# Patient Record
Sex: FEMALE | Race: BLACK OR AFRICAN AMERICAN | Employment: FULL TIME | URBAN - METROPOLITAN AREA
[De-identification: names, ages, dates, MRNs, and addresses within clinical notes are randomized per-mention and may not be internally consistent; named-entity substitution may affect disease eponyms.]

---

## 2019-05-08 ENCOUNTER — HOSPITAL ENCOUNTER (OUTPATIENT)
Dept: PHYSICAL THERAPY | Age: 34
Discharge: HOME | End: 2019-05-08
Payer: COMMERCIAL

## 2019-05-08 PROCEDURE — 97162 PT EVAL MOD COMPLEX 30 MIN: CPT

## 2019-05-08 PROCEDURE — 97140 MANUAL THERAPY 1/> REGIONS: CPT

## 2019-05-08 PROCEDURE — 97110 THERAPEUTIC EXERCISES: CPT

## 2019-05-08 NOTE — PROGRESS NOTES
[facility] [address] [facility] [place], [address] [place]  
[phone] fax Plan of Care/ Statement of Necessity for Physical Therapy Services Patient name: Divya Irene Start of Care: 2019 Referral source: Mary Jane Patel MD : 1985 Medical Diagnosis: Left shoulder pain [M25.512] Payor: [facility] / Plan: [facility] PPO / Product Type: PPO /  Onset Date:~3 months prior to evaluation Treatment Diagnosis: Left Shoulder Pain Prior Hospitalization: see medical history Provider#: [id number] Medications: Verified on Patient summary List  
 Comorbidities: Depression, Allergies, BMI>30, HTN Prior Level of Function: RHD, (I) Functional ADLs, Does Not Work Out, Work Full Time, (I) Driving The Plan of Care and following information is based on the information from the initial evaluation. Assessment: 
 
Patient presents with s/s consistent with chronic left rotator cuff tendinopathy with patient noted to demonstrate painful, but full strength of the shoulder external rotators. Patient noted with severe loss of shoulder AROM with greater available shoulder PROM in comparison but empty end-feels noted at end-range. High symptom irritability and severity demonstrated. Unable to clear cervical spine at this time with patient with (+) cervical compression but noted with vague symptom presentation therefore increasing difficulty with diagnosis. To continue to monitor response to PT with further assessment of cervical spine as appropriate.  To emphasize improvements in shoulder functional mobility and strength to improve ease with return to PLOF. 
  
Patient will continue to benefit from skilled PT services to modify and progress therapeutic interventions, address functional mobility deficits, address ROM deficits, address strength deficits, analyze and address soft tissue restrictions, analyze and cue movement patterns and analyze and modify body mechanics/ergonomics to attain remaining goals. Key Information: 
 
Cervical Screen: No symptom change with cervical AROM except: Right Cervical Rotation (WNL; Right superior shoulder pain) 
            - (+) Compression, (-) Left Spurling, (-) Distraction 
  
ROM:  [] Unable to assess at this time AROM                                                               PROM 
  Left Right   Left Right Flexion 67 165 Flexion 90 (p!, empty)   Extension   60 Extension      
Abduction 30 160 Abduction 120 (p!, empty)    
ER @ 0 Degrees     ER @ 0 Degrees      
Functional ER NT T3 ER @ 90 Degrees 78    
Functional IR NT T6 IR @ 90 Degrees 40    
  
**Patient with high irritability/severity of symptoms altering objective assessment able to be completed Evaluation Complexity History MEDIUM  Complexity : 1-2 comorbidities / personal factors will impact the outcome/ POC ; Examination MEDIUM Complexity : 3 Standardized tests and measures addressing body structure, function, activity limitation and / or participation in recreation  ;Presentation MEDIUM Complexity : Evolving with changing characteristics  ; Clinical Decision Making MEDIUM Complexity : FOTO score of 26-74 Overall Complexity Rating: MEDIUM Problem List: pain affecting function, decrease ROM, decrease strength, decrease ADL/ functional abilitiies, decrease activity tolerance and decrease flexibility/ joint mobility Treatment Plan may include any combination of the following: Therapeutic exercise, Therapeutic activities, Neuromuscular re-education, Physical agent/modality, Manual therapy, Patient education, Self Care training, Functional mobility training and Home safety training Patient / Family readiness to learn indicated by: asking questions, trying to perform skills and interest 
 Persons(s) to be included in education: patient (P) Barriers to Learning/Limitations: None Patient Goal (s): I want to get rid of this pain Patient Self Reported Health Status: good Rehabilitation Potential: good Short Term Goals: To be accomplished in 3 weeks: 1. Patient will subjectively report full compliance with prescribed HEP. Eval: HEP provided 2. Patient will demonstrate left shoulder flexion and abduction PROM >/= 160 degrees to improve ease with dressing. Eval: Left Shoulder Flexion PROM 120 deg, Left Shoulder Abduction PROM 90 deg 3. Patient will demonstrate left shoulder flexion and abduction AROM >/= 150 degrees to improve ease with overhead reaching. Eval: Left Shoulder Flexion AROM = 67 deg, Left Shoulder Abduction AROM = 30 deg Long Term Goals: To be accomplished in 6 weeks: 1. Patient will demonstrate a significant functional improvement as demonstrated by a score of >/= 69 on FOTO. Eval: FOTO = 41 
2. Patient will demonstrate left shoulder flexion and abduction MMT >/= 4+/5 to improve ease with work-related ADLs. Eval: Left Shoulder Flexion MMT = 2+/5, Left Shoulder Abduction MMT = 2_/5 3. Patient will subjectively report >/= 70% improvement in symptoms to improve overall quality of life. Eval: 0% Frequency / Duration: Patient to be seen 2 times per week for 6 weeks. Patient/ Caregiver education and instruction: Diagnosis, prognosis, self care, activity modification and exercises 
 [x]  Plan of care has been reviewed with RAJ Bolton, PT 5/8/2019 12:26 PM 
________________________________________________________________________ I certify that the above Therapy Services are being furnished while the patient is under my care. I agree with the treatment plan and certify that this therapy is necessary.  
 
[de-identified] Signature:____________Date:_________TIME:________ 
 
[employer], Date and Time must be completed for valid certification ** 
 Please sign and return to [facility] [address] [facility] [place], [address] [place]  
[phone] fax

## 2019-05-08 NOTE — PROGRESS NOTES
PT DAILY TREATMENT NOTE 10-18 Patient Name: Quincy Peck 
Date:2019 : 1985 [x]  Patient  Verified Payor: BLUE CROSS / Plan: [facility] PPO / Product Type: PPO / In time:200  Out time:258 Total Treatment Time (min): 58 Visit #: 1 of 12 Medicare/BCBS Only Total Timed Codes (min):  48 1:1 Treatment Time:  21 Treatment Area: Left shoulder pain [M25.512] Physical Therapy Evaluation - Shoulder SUBJECTIVE Any medication changes, allergies to medications, adverse drug reactions, diagnosis change, or new procedure performed?: [x] No    [] Yes (see summary sheet for update) Subjective functional status/changes:    
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 Patient denies previous episodes of shoulder pain. Patient reports gradual worsening of pain since onset with increase in pain with overhead ROM and left sidelying. Patient reports improved symptoms with support of the left UE. Patient denies sleep disturbances with avoidance of left sidelying. Patient denies the following: N/T, chest pain, tightness, shortness of breath. Pain Intensity (0-10, VAS): Current 5, Worst 8, Best 5 Patient Goals: \"I want to get rid of this pain\" OBJECTIVE EXAMINATION 
 
BP: 140/86 mmHg Posture: Flattened thoracic kyphosis Cervical Screen: No symptom change with cervical AROM except: Right Cervical Rotation (WNL; Right superior shoulder pain) 
 - (+) Compression, (-) Left Spurling, (-) Distraction ROM:  [] Unable to assess at this time AROM                                                               PROM Left Right  Left Right Flexion 67 165 Flexion 90 (p!, empty) Extension  60 Extension Abduction 30 160 Abduction 120 (p!, empty) ER @ 0 Degrees   ER @ 0 Degrees Functional ER NT T3 ER @ 90 Degrees 78 Functional IR NT T6 IR @ 90 Degrees 40 Strength:   [] Unable to assess at this time L (1-5) R (1-5) Flexors 2+ 5 Abductors 2+ 5 External Rotators 4+ (p!) 5 Internal Rotators 5 5 Extensors 5 5 Elbow Flexion 5 5 Elbow Extension 5 5 Joint Mobility Palpation: Non-specific TTP to left ACJ, left subacromial space, left upper arm ER Lag  [x] Pos   [] Neg  
 
**Patient with high irritability/severity of symptoms altering objective assessment able to be completed OBJECTIVE Modality rationale: decrease inflammation and decrease pain to improve the patients ability to improve ease with sleep Min Type Additional Details 10 [x]  Ice     []  heat 
[]  Ice massage Position: Seated Location: Left Shoulder, Post-Tx 25 min [x]Eval                  []Re-Eval  
 
8 min Therapeutic Exercise:  [x] See flow sheet : Patient educated regarding completion of prescribed HEP and provided with written HEP instructions, Patient educated regarding diagnosis and PT PoC Rationale: increase ROM and increase strength to improve the patients ability to improve ease with functional ADLs 5 min Therapeutic Activity:  [x]  See flow sheet : Discussion regarding modification of work related and household ADLs Rationale: increase ROM and increase strength  to improve the patients ability to improve ease with work-related ADLs 10 min Manual Therapy:   
Supine, Left GH AP Grade I Mobilization (OPP) Supine, Left GH Long Axis Inferior Mobilization ([employer] deg abd) Supine, Left GH Lateral Grade I-II Distraction (OPP) Supine, Left GH Passive Physiological Mobilization with [address] Distraction - Flexion/Scaption/Abduction Rationale: decrease pain, increase ROM and increase tissue extensibility to improve ease with overhead reaching. With 
 [] TE 
 [] TA 
 [] neuro 
 [] other: Patient Education: [x] Review HEP [] Progressed/Changed HEP based on:  
[] positioning   [] body mechanics   [] transfers   [] heat/ice application   
[] other:   
 
Other Objective/Functional Measures: See objective above. Pain Level (0-10 scale) post treatment: 6 
 
ASSESSMENT/Changes in Function: Patient presents with s/s consistent with chronic left rotator cuff tendinopathy with patient noted to demonstrate painful, but full strength of the shoulder external rotators. Patient noted with severe loss of shoulder AROM with greater available shoulder PROM in comparison but empty end-feels noted at end-range. High symptom irritability and severity demonstrated. Unable to clear cervical spine at this time with patient with (+) cervical compression but noted with vague symptom presentation therefore increasing difficulty with diagnosis. To continue to monitor response to PT with further assessment of cervical spine as appropriate. To emphasize improvements in shoulder functional mobility and strength to improve ease with return to PLOF. Patient will continue to benefit from skilled PT services to modify and progress therapeutic interventions, address functional mobility deficits, address ROM deficits, address strength deficits, analyze and address soft tissue restrictions, analyze and cue movement patterns and analyze and modify body mechanics/ergonomics to attain remaining goals. [x]  See Plan of Care 
[]  See progress note/recertification []  See Discharge Summary Progress towards goals / Updated goals: 
 
Short Term Goals: To be accomplished in 3 weeks: 1. Patient will subjectively report full compliance with prescribed HEP. Eval: HEP provided 2. Patient will demonstrate left shoulder flexion and abduction PROM >/= 160 degrees to improve ease with dressing. Eval: Left Shoulder Flexion PROM 120 deg, Left Shoulder Abduction PROM 90 deg 3. Patient will demonstrate left shoulder flexion and abduction AROM >/= 150 degrees to improve ease with overhead reaching. Eval: Left Shoulder Flexion AROM = 67 deg, Left Shoulder Abduction AROM = 30 deg Long Term Goals: To be accomplished in 6 weeks: 1. Patient will demonstrate a significant functional improvement as demonstrated by a score of >/= 69 on FOTO. Eval: FOTO = 41 
2. Patient will demonstrate left shoulder flexion and abduction MMT >/= 4+/5 to improve ease with work-related ADLs. Eval: Left Shoulder Flexion MMT = 2+/5, Left Shoulder Abduction MMT = 2_/5 3. Patient will subjectively report >/= 70% improvement in symptoms to improve overall quality of life. Eval: 0% PLAN [x]  Upgrade activities as tolerated     []  Continue plan of care 
[]  Update interventions per flow sheet      
[]  Discharge due to:_ 
[]  Other:_   
 
Franko Bennett PT 5/8/2019  12:26 PM 
 
Future Appointments Date Time Provider Maggi Meza 5/8/2019  2:00 PM Leigh Walsh, PT [facility]

## 2019-05-10 ENCOUNTER — HOSPITAL ENCOUNTER (OUTPATIENT)
Dept: PHYSICAL THERAPY | Age: 34
Discharge: HOME | End: 2019-05-10
Payer: COMMERCIAL

## 2019-05-10 PROCEDURE — 97110 THERAPEUTIC EXERCISES: CPT | Performed by: PHYSICAL THERAPIST

## 2019-05-10 PROCEDURE — 97140 MANUAL THERAPY 1/> REGIONS: CPT | Performed by: PHYSICAL THERAPIST

## 2019-05-10 NOTE — PROGRESS NOTES
PT DAILY TREATMENT NOTE 10-18 Patient Name: Tyrone Luke 
Date:5/10/2019 : 1985 [x]  Patient  Verified Payor: BLUE CROSS / Plan: [facility] PPO / Product Type: PPO / In time:3:02  Out time:3:43 Total Treatment Time (min): 41 Visit #: 2 of 12 Medicare/BCBS Only Total Timed Codes (min):  31 1:1 Treatment Time:  30 Treatment Area: Left shoulder pain [M25.512] SUBJECTIVE Pain Level (0-10 scale): 5/10 Any medication changes, allergies to medications, adverse drug reactions, diagnosis change, or new procedure performed?: [x] No    [] Yes (see summary sheet for update) Subjective functional status/changes:   [] No changes reported Patient reports compliance with her HEP. Has difficulty with the isometric exercise, notes it is slightly painful. OBJECTIVE Modality rationale: decrease pain to improve the patients ability to increase their functional activity level. Min Type Additional Details  
 [] Estim:  []Unatt       []IFC  []Premod []Other:  []w/ice   []w/heat Position: Location:  
 [] Estim: []Att    []TENS instruct  []NMES []Other:  []w/US   []w/ice   []w/heat Position: Location:  
 []  Traction: [] Cervical       []Lumbar 
                     [] Prone          []Supine []Intermittent   []Continuous Lbs: 
[] before manual 
[] after manual  
 []  Ultrasound: []Continuous   [] Pulsed []1MHz   []3MHz W/cm2: 
Location:  
 []  Iontophoresis with dexamethasone Location: [] Take home patch  
[] In clinic  
10 [x]  Ice     []  heat 
[]  Ice massage 
[]  Laser  
[]  Anodyne Position: Supine Location: left shoulder  
 []  Laser with stim 
[]  Other:  Position: Location:  
 []  Vasopneumatic Device Pressure:       [] lo [] med [] hi  
Temperature: [] lo [] med [] hi  
[] Skin assessment post-treatment:  []intact []redness- no adverse reaction []redness  adverse reaction:  
 
 
23 min Therapeutic Exercise:  [] See flow sheet :  
Rationale: increase ROM and increase strength to improve the patients ability to increase their functional activity level. 8 min Manual Therapy:  Grade I mobs for decreased pain, gentle manual stretching to increase elevation and rotation. Rationale: decrease pain, increase ROM and increase tissue extensibility to increase ease of motion to improve function. With 
 [] TE 
 [] TA 
 [] neuro 
 [] other: Patient Education: [x] Review HEP [] Progressed/Changed HEP based on:  
[] positioning   [] body mechanics   [] transfers   [] heat/ice application   
[] other:   
 
Other Objective/Functional Measures: Reviewed isometric abduction exercise and advised patient to try submaximal effort then build up. Pain Level (0-10 scale) post treatment: 5/10 ASSESSMENT/Changes in Function: Patient continues with pain and limited AROM left shoulder. Patient will continue to benefit from skilled PT services to modify and progress therapeutic interventions, address functional mobility deficits, address ROM deficits, address strength deficits, analyze and address soft tissue restrictions, analyze and cue movement patterns and analyze and modify body mechanics/ergonomics to attain remaining goals. [x]  See Plan of Care 
[]  See progress note/recertification 
[]  See Discharge Summary Progress towards goals / Updated goals: 
Short Term Goals: To be accomplished in 3 weeks: 1. Patient will subjectively report full compliance with prescribed HEP. Eval: HEP provided 2. Patient will demonstrate left shoulder flexion and abduction PROM >/= 160 degrees to improve ease with dressing. Eval: Left Shoulder Flexion PROM 120 deg, Left Shoulder Abduction PROM 90 deg 3. Patient will demonstrate left shoulder flexion and abduction AROM >/= 150 degrees to improve ease with overhead reaching. Eval: Left Shoulder Flexion AROM = 67 deg, Left Shoulder Abduction AROM = 30 deg 
  
Long Term Goals: To be accomplished in 6 weeks: 1. Patient will demonstrate a significant functional improvement as demonstrated by a score of >/= 69 on FOTO. Eval: FOTO = 41 
2. Patient will demonstrate left shoulder flexion and abduction MMT >/= 4+/5 to improve ease with work-related ADLs. Eval: Left Shoulder Flexion MMT = 2+/5, Left Shoulder Abduction MMT = 2_/5 3. Patient will subjectively report >/= 70% improvement in symptoms to improve overall quality of life. Eval: 0% PLAN [x]  Upgrade activities as tolerated     [x]  Continue plan of care 
[]  Update interventions per flow sheet      
[]  Discharge due to:_ 
[]  Other:_ Preston Byrd PT 5/10/2019  3:30 PM 
 
Future Appointments Date Time Provider Maggi Meza 5/13/2019  4:00 PM Dinorah Emmanuel, PTA [facility]  
5/15/2019  4:00 PM Chad Carmona [facility]  
5/20/2019  4:00 PM Dinorah Emmanuel [place] [facility]  
5/22/2019  4:00 PM Adi James, PT [facility]  
5/29/2019 11:30 AM Dinorah Emmanuel, PTA [facility]  
5/31/2019 11:00 AM Arnav Aguilera [facility]  
6/3/2019  4:30 PM Copeland Lien, PTA [facility]  
6/5/2019  4:00 PM Adi James, PT [facility]  
6/10/2019  4:00 PM Chad Carmona [facility]  
6/12/2019  4:00 PM Adi James, PT [facility]

## 2019-05-13 ENCOUNTER — HOSPITAL ENCOUNTER (OUTPATIENT)
Dept: PHYSICAL THERAPY | Age: 34
Discharge: HOME | End: 2019-05-13
Payer: COMMERCIAL

## 2019-05-13 PROCEDURE — 97110 THERAPEUTIC EXERCISES: CPT

## 2019-05-13 PROCEDURE — 97140 MANUAL THERAPY 1/> REGIONS: CPT

## 2019-05-13 NOTE — PROGRESS NOTES
PT DAILY TREATMENT NOTE 10-18 Patient Name: Irving Bernardo 
Date:2019 : 1985 [x]  Patient  Verified Payor: BLUE CROSS / Plan: [facility] PPO / Product Type: PPO / In time:4:01  Out time:4:49 Total Treatment Time (min): 48 Visit #: 3 of 12 Medicare/BCBS Only Total Timed Codes (min):  38 1:1 Treatment Time:  45 Treatment Area: Left shoulder pain [M25.512] SUBJECTIVE Pain Level (0-10 scale): 5 Any medication changes, allergies to medications, adverse drug reactions, diagnosis change, or new procedure performed?: [x] No    [] Yes (see summary sheet for update) Subjective functional status/changes:   [] No changes reported Pt reports that her shoulder has increase in pain when she has to lift to carry her niece. OBJECTIVE Modality rationale: decrease pain to improve the patients ability to decrease difficulty while performing tasks. Min Type Additional Details  
 [] Estim:  []Unatt       []IFC  []Premod []Other:  []w/ice   []w/heat Position: Location:  
 [] Estim: []Att    []TENS instruct  []NMES []Other:  []w/US   []w/ice   []w/heat Position: Location:  
 []  Traction: [] Cervical       []Lumbar 
                     [] Prone          []Supine []Intermittent   []Continuous Lbs: 
[] before manual 
[] after manual  
 []  Ultrasound: []Continuous   [] Pulsed []1MHz   []3MHz W/cm2: 
Location:  
 []  Iontophoresis with dexamethasone Location: [] Take home patch  
[] In clinic  
10 [x]  Ice     []  heat 
[]  Ice massage 
[]  Laser  
[]  Anodyne Position:sitting Location:left shoulder  
 []  Laser with stim 
[]  Other:  Position: Location:  
 []  Vasopneumatic Device Pressure:       [] lo [] med [] hi  
Temperature: [] lo [] med [] hi  
[] Skin assessment post-treatment:  []intact []redness- no adverse reaction 
  []redness  adverse reaction: 28 min Therapeutic Exercise:  [x] See flow sheet :  
Rationale: increase ROM and increase strength to improve the patients ability to increase tolerance to activities. 10 min Manual Therapy:  STJ mobs, GHJ mobs, PROM, STM/TPR UT/LS subscap Rationale: decrease pain, increase ROM, increase tissue extensibility and decrease trigger points to increase ease with ADLs. With 
 [] TE 
 [] TA 
 [] neuro 
 [] other: Patient Education: [x] Review HEP [] Progressed/Changed HEP based on:  
[] positioning   [] body mechanics   [] transfers   [] heat/ice application   
[] other:   
 
Other Objective/Functional Measures:  Pt reports performing HEP 5x a week. Pain Level (0-10 scale) post treatment: 4 
 
ASSESSMENT/Changes in Function: Pt continue to have high irritability with exercises. Pt reports sub iso cause increase in pain. Patient will continue to benefit from skilled PT services to modify and progress therapeutic interventions, address functional mobility deficits, address ROM deficits, address strength deficits and analyze and address soft tissue restrictions to attain remaining goals. []  See Plan of Care 
[]  See progress note/recertification 
[]  See Discharge Summary Progress towards goals / Updated goals: 
Short Term Goals: To be accomplished in 3 weeks: 1. Patient will subjectively report full compliance with prescribed HEP. Eval: HEP provided Current: Progressing: Pt reports performing HEP 5x a week. 5/13/19 2. Patient will demonstrate left shoulder flexion and abduction PROM >/= 160 degrees to improve ease with dressing. Eval: Left Shoulder Flexion PROM 120 deg, Left Shoulder Abduction PROM 90 deg 3. Patient will demonstrate left shoulder flexion and abduction AROM >/= 150 degrees to improve ease with overhead reaching. Eval: Left Shoulder Flexion AROM = 67 deg, Left Shoulder Abduction AROM = 30 deg 
  
Long Term Goals: To be accomplished in 6 weeks: 1. Patient will demonstrate a significant functional improvement as demonstrated by a score of >/= 69 on FOTO. Eval: FOTO = 41 
2. Patient will demonstrate left shoulder flexion and abduction MMT >/= 4+/5 to improve ease with work-related ADLs. Eval: Left Shoulder Flexion MMT = 2+/5, Left Shoulder Abduction MMT = 2_/5 3. Patient will subjectively report >/= 70% improvement in symptoms to improve overall quality of life. Eval: 0% 
  
  
 
 
 
PLAN 
[]  Upgrade activities as tolerated     [x]  Continue plan of care 
[]  Update interventions per flow sheet      
[]  Discharge due to:_ 
[]  Other:_ Maryjane Mccormick PTA 5/13/2019  4:07 PM 
 
Future Appointments Date Time Provider Maggi Meza 5/15/2019  2:30 PM Albina Thurman PTA [facility]  
5/20/2019  4:00 PM Albina Thurman [place] [facility]  
5/22/2019  4:00 PM Ramona Hernandez PT [facility]  
5/29/2019 11:30 AM Albina Thurman PTA [facility]  
5/31/2019 11:00 AM Juan Sanders [facility]  
6/3/2019  4:30 PM Albina Thurman PTA [facility]  
6/5/2019  4:00 PM Matheus, [address]ppy[address] Jonathan Barrett [facility]  
6/10/2019  4:00 PM Albina Thurman [place] [facility]  
6/12/2019  4:00 PM Ramona Hernandez PT [facility]

## 2019-05-15 ENCOUNTER — HOSPITAL ENCOUNTER (OUTPATIENT)
Dept: PHYSICAL THERAPY | Age: 34
Discharge: HOME | End: 2019-05-15
Payer: COMMERCIAL

## 2019-05-15 PROCEDURE — 97110 THERAPEUTIC EXERCISES: CPT

## 2019-05-15 PROCEDURE — 97140 MANUAL THERAPY 1/> REGIONS: CPT

## 2019-05-15 NOTE — PROGRESS NOTES
PT DAILY TREATMENT NOTE 10-18 Patient Name: Geno Broussard 
Date:5/15/2019 : 1985 [x]  Patient  Verified Payor: BLUE CROSS / Plan: [facility] PPO / Product Type: PPO / In time:2:33  Out time:3:21 Total Treatment Time (min): 48 Visit #: 3 [id number] Medicare/BCBS Only Total Timed Codes (min):  38 1:1 Treatment Time:  45 Treatment Area: Left shoulder pain [M25.512] SUBJECTIVE Pain Level (0-10 scale): 4 Any medication changes, allergies to medications, adverse drug reactions, diagnosis change, or new procedure performed?: [x] No    [] Yes (see summary sheet for update) Subjective functional status/changes:   [] No changes reported Pt reports having to reach behind the seat in the car aggravates her arm. OBJECTIVE Modality rationale: decrease pain to improve the patients ability to decrease difficulty while performing tasks. Min Type Additional Details  
 [] Estim:  []Unatt       []IFC  []Premod []Other:  []w/ice   []w/heat Position: Location:  
 [] Estim: []Att    []TENS instruct  []NMES []Other:  []w/US   []w/ice   []w/heat Position: Location:  
 []  Traction: [] Cervical       []Lumbar 
                     [] Prone          []Supine []Intermittent   []Continuous Lbs: 
[] before manual 
[] after manual  
 []  Ultrasound: []Continuous   [] Pulsed []1MHz   []3MHz W/cm2: 
Location:  
 []  Iontophoresis with dexamethasone Location: [] Take home patch  
[] In clinic  
10 [x]  Ice     []  heat 
[]  Ice massage 
[]  Laser  
[]  Anodyne Position:sittingt Location:right shoulder  
 []  Laser with stim 
[]  Other:  Position: Location:  
 []  Vasopneumatic Device Pressure:       [] lo [] med [] hi  
Temperature: [] lo [] med [] hi  
[] Skin assessment post-treatment:  []intact []redness- no adverse reaction 
  []redness  adverse reaction:  
 
 
  
  
 28 min Therapeutic Exercise:  [x] See flow sheet :  
Rationale: increase ROM and increase strength to improve the patients ability to increase tolerance to activities.  
  
  
10 min Manual Therapy:  STJ mobs, GHJ mobs, PROM, STM/TPR UT/LS subscap Rationale: decrease pain, increase ROM, increase tissue extensibility and decrease trigger points to increase ease with ADLs.   
 
 
     
With 
 [] TE 
 [] TA 
 [] neuro 
 [] other: Patient Education: [x] Review HEP [] Progressed/Changed HEP based on:  
[] positioning   [] body mechanics   [] transfers   [] heat/ice application   
[] other:   
 
Other Objective/Functional Measures: Pt tender with subscap release. Pain Level (0-10 scale) post treatment: 5 
 
ASSESSMENT/Changes in Function: Continue to progress pt with more against gravity exercises without increase in pain. Patient will continue to benefit from skilled PT services to modify and progress therapeutic interventions, address functional mobility deficits, address ROM deficits, address strength deficits, analyze and address soft tissue restrictions and analyze and cue movement patterns to attain remaining goals. []  See Plan of Care 
[]  See progress note/recertification 
[]  See Discharge Summary Progress towards goals / Updated goals: 
Short Term Goals: To be accomplished in 3 weeks: 1. Patient will subjectively report full compliance with prescribed HEP. Eval: HEP provided Current: Progressing: Pt reports performing HEP 5x a week. 5/13/19 2. Patient will demonstrate left shoulder flexion and abduction PROM >/= 160 degrees to improve ease with dressing. Eval: Left Shoulder Flexion PROM 120 deg, Left Shoulder Abduction PROM 90 deg 3. Patient will demonstrate left shoulder flexion and abduction AROM >/= 150 degrees to improve ease with overhead reaching.  
Eval: Left Shoulder Flexion AROM = 67 deg, Left Shoulder Abduction AROM = 30 deg 
  
 Long Term Goals: To be accomplished in 6 weeks: 1. Patient will demonstrate a significant functional improvement as demonstrated by a score of >/= 69 on FOTO. Eval: FOTO = 41 
2. Patient will demonstrate left shoulder flexion and abduction MMT >/= 4+/5 to improve ease with work-related ADLs. Eval: Left Shoulder Flexion MMT = 2+/5, Left Shoulder Abduction MMT = 2_/5 3. Patient will subjectively report >/= 70% improvement in symptoms to improve overall quality of life. Eval: 0% 
  
 
 
 
PLAN 
[]  Upgrade activities as tolerated     [x]  Continue plan of care 
[]  Update interventions per flow sheet      
[]  Discharge due to:_ 
[]  Other:_ Linda Gonzalez PTA 5/15/2019  2:52 PM 
 
Future Appointments Date Time Provider Maggi Meza 5/20/2019  4:00 PM Elizabeth Lora PTA [facility]  
5/22/2019  4:00 PM Chad Wise [facility]  
5/29/2019 11:30 AM Elizabeth Lora PTA [facility]  
5/31/2019 11:00 AM Valiant Saint [facility]  
6/3/2019  4:30 PM Elizabeth Lora PTA [facility]  
6/5/2019  4:00 PM Heidi Smith, HUGO [facility]  
6/10/2019  4:00 PM Chad Wise [facility]  
6/12/2019  4:00 PM Heidi Smith, PT [facility]

## 2019-05-20 ENCOUNTER — HOSPITAL ENCOUNTER (OUTPATIENT)
Dept: PHYSICAL THERAPY | Age: 34
Discharge: HOME | End: 2019-05-20
Payer: COMMERCIAL

## 2019-05-20 PROCEDURE — 97140 MANUAL THERAPY 1/> REGIONS: CPT

## 2019-05-20 PROCEDURE — 97110 THERAPEUTIC EXERCISES: CPT

## 2019-05-20 NOTE — PROGRESS NOTES
PT DAILY TREATMENT NOTE 10-18 Patient Name: Tri Connelly 
Date:2019 : 1985 [x]  Patient  Verified Payor: BLUE CROSS / Plan: [facility] PPO / Product Type: PPO / In time:4:00  Out time:4:49 Total Treatment Time (min): 49 Visit #: 5 of 12 Medicare/BCBS Only Total Timed Codes (min):  39 1:1 Treatment Time:  44 Treatment Area: Left shoulder pain [M25.512] SUBJECTIVE Pain Level (0-10 scale): 6 Any medication changes, allergies to medications, adverse drug reactions, diagnosis change, or new procedure performed?: [x] No    [] Yes (see summary sheet for update) Subjective functional status/changes:   [] No changes reported Pt reports that she feels that she slept on her shoulder last night and caused increase in pain. OBJECTIVE Modality rationale: decrease pain to improve the patients ability to decrease difficulty while performing tasks. Min Type Additional Details  
 [] Estim:  []Unatt       []IFC  []Premod []Other:  []w/ice   []w/heat Position: Location:  
 [] Estim: []Att    []TENS instruct  []NMES []Other:  []w/US   []w/ice   []w/heat Position: Location:  
 []  Traction: [] Cervical       []Lumbar 
                     [] Prone          []Supine []Intermittent   []Continuous Lbs: 
[] before manual 
[] after manual  
 []  Ultrasound: []Continuous   [] Pulsed []1MHz   []3MHz W/cm2: 
Location:  
 []  Iontophoresis with dexamethasone Location: [] Take home patch  
[] In clinic  
10 [x]  Ice     []  heat 
[]  Ice massage 
[]  Laser  
[]  Anodyne Position:sitting Location:shoulder  
 []  Laser with stim 
[]  Other:  Position: Location:  
 []  Vasopneumatic Device Pressure:       [] lo [] med [] hi  
Temperature: [] lo [] med [] hi  
[] Skin assessment post-treatment:  []intact []redness- no adverse reaction 
  []redness  adverse reaction:  
 
 
  
   
29 min Therapeutic Exercise:  [x] See flow sheet :  
Rationale: increase ROM and increase strength to improve the patients ability to increase tolerance to activities.  
  
  
10 min Manual Therapy:  STJ mobs, GHJ mobs, PROM, STM/TPR UT/LS subscap Rationale: decrease pain, increase ROM, increase tissue extensibility and decrease trigger points to increase ease with ADLs.   
  
 
 
 
       
With 
 [] TE 
 [] TA 
 [] neuro 
 [] other: Patient Education: [x] Review HEP [] Progressed/Changed HEP based on:  
[] positioning   [] body mechanics   [] transfers   [] heat/ice application   
[] other:   
 
Other Objective/Functional Measures: left shoulder flexion PROM 150 deg, left shoulder ABD PROM 160 deg. Pain Level (0-10 scale) post treatment: 4 
 
ASSESSMENT/Changes in Function: Pt has progressed well with PROM and only slight pain through the motion. Patient will continue to benefit from skilled PT services to modify and progress therapeutic interventions, address functional mobility deficits, address ROM deficits, address strength deficits and analyze and address soft tissue restrictions to attain remaining goals. []  See Plan of Care 
[]  See progress note/recertification 
[]  See Discharge Summary Progress towards goals / Updated goals: 
Short Term Goals: To be accomplished in 3 weeks: 1. Patient will subjectively report full compliance with prescribed HEP. Eval: HEP provided Current: Progressing: Pt reports performing HEP 5x a week. 5/13/19 2. Patient will demonstrate left shoulder flexion and abduction PROM >/= 160 degrees to improve ease with dressing. Eval: Left Shoulder Flexion PROM 120 deg, Left Shoulder Abduction PROM 90 deg Current: Progressing: left shoulder flexion PROM 150 deg, left shoulder ABD PROM 160 deg. 5/20/19 3. Patient will demonstrate left shoulder flexion and abduction AROM >/= 150 degrees to improve ease with overhead reaching. Eval: Left Shoulder Flexion AROM = 67 deg, Left Shoulder Abduction AROM = 30 deg 
  
Long Term Goals: To be accomplished in 6 weeks: 1. Patient will demonstrate a significant functional improvement as demonstrated by a score of >/= 69 on FOTO. Eval: FOTO = 41 
2. Patient will demonstrate left shoulder flexion and abduction MMT >/= 4+/5 to improve ease with work-related ADLs. Eval: Left Shoulder Flexion MMT = 2+/5, Left Shoulder Abduction MMT = 2_/5 3. Patient will subjectively report >/= 70% improvement in symptoms to improve overall quality of life. Eval: 0% 
  
  
 
 
PLAN 
[]  Upgrade activities as tolerated     [x]  Continue plan of care 
[]  Update interventions per flow sheet      
[]  Discharge due to:_ 
[]  Other:_ Herman Bower PTA 5/20/2019  4:09 PM 
 
Future Appointments Date Time Provider Maggi Meza 5/22/2019  4:00 PM Ashley Nickerson PTA [facility]  
5/29/2019 11:30 AM Ashley Nickerson PTA [facility]  
5/31/2019 11:00 AM Krystal Wise [facility]  
6/3/2019  4:30 PM Ashley Nickerson PTA [facility]  
6/5/2019  4:00 PM Mary Gutierrez PT [facility]  
6/10/2019  4:00 PM Chad Isaac [facility]  
6/12/2019  4:00 PM Mary Gutierrez PT [facility]

## 2019-05-22 ENCOUNTER — APPOINTMENT (OUTPATIENT)
Dept: PHYSICAL THERAPY | Age: 34
End: 2019-05-22
Payer: COMMERCIAL

## 2019-05-29 ENCOUNTER — APPOINTMENT (OUTPATIENT)
Dept: PHYSICAL THERAPY | Age: 34
End: 2019-05-29
Payer: COMMERCIAL

## 2019-05-31 ENCOUNTER — HOSPITAL ENCOUNTER (OUTPATIENT)
Dept: PHYSICAL THERAPY | Age: 34
Discharge: HOME | End: 2019-05-31
Payer: COMMERCIAL

## 2019-05-31 PROCEDURE — 97140 MANUAL THERAPY 1/> REGIONS: CPT

## 2019-05-31 PROCEDURE — 97110 THERAPEUTIC EXERCISES: CPT

## 2019-05-31 NOTE — PROGRESS NOTES
PT DAILY TREATMENT NOTE 10-18    Patient Name: Bernadine Mcdonald  Date:2019  : 1985  [x]  Patient  Verified  Payor: BLUE CROSS / Plan: [facility] PPO / Product Type: PPO /    In time:1105  Out time:1150  Total Treatment Time (min): 45  Visit #: 6 of 12    Medicare/BCBS Only   Total Timed Codes (min):  35 1:1 Treatment Time:  25       Treatment Area: Left shoulder pain [M25.512]    SUBJECTIVE  Pain Level (0-10 scale): 3  Any medication changes, allergies to medications, adverse drug reactions, diagnosis change, or new procedure performed?: [x] No    [] Yes (see summary sheet for update)  Subjective functional status/changes:   [] No changes reported  Patient reported decreased overall shoulder pain today    OBJECTIVE    Modality rationale: decrease pain to improve the patients ability to perform ADLs   Min Type Additional Details    [] Estim:  []Unatt       []IFC  []Premod                        []Other:  []w/ice   []w/heat  Position:  Location:    [] Estim: []Att    []TENS instruct  []NMES                    []Other:  []w/US   []w/ice   []w/heat  Position:  Location:    []  Traction: [] Cervical       []Lumbar                       [] Prone          []Supine                       []Intermittent   []Continuous Lbs:  [] before manual  [] after manual    []  Ultrasound: []Continuous   [] Pulsed                           []1MHz   []3MHz W/cm2:  Location:    []  Iontophoresis with dexamethasone         Location: [] Take home patch   [] In clinic   10 [x]  Ice     []  heat  []  Ice massage  []  Laser   []  Anodyne Position:  Location:    []  Laser with stim  []  Other:  Position:  Location:    []  Vasopneumatic Device Pressure:       [] lo [] med [] hi   Temperature: [] lo [] med [] hi   [] Skin assessment post-treatment:  []intact []redness- no adverse reaction    []redness  adverse reaction:      min []Eval                  []Re-Eval       25 min Therapeutic Exercise:  [] See flow sheet : Rationale: increase ROM and increase strength to improve the patients ability to perform ADLs     min Therapeutic Activity:  []  See flow sheet :   Rationale:   to improve the patients ability to       min Neuromuscular Re-education:  []  See flow sheet :   Rationale:   to improve the patients ability to     10 min Manual Therapy:  STJ mobs, GHJ mobs, PROM, STM/TPR UT/LS subscap   Rationale: increase ROM and increase tissue extensibility to perform ADLs     min Gait Training:  ___ feet with ___ device on level surfaces with ___ level of assist   Rationale: With   [] TE   [] TA   [] neuro   [] other: Patient Education: [x] Review HEP    [] Progressed/Changed HEP based on:   [] positioning   [] body mechanics   [] transfers   [] heat/ice application    [] other:      Other Objective/Functional Measures: FOTO 65     Pain Level (0-10 scale) post treatment: 1    ASSESSMENT/Changes in Function: overall function improving as FOTO increased from 41 to 65 today    Patient will continue to benefit from skilled PT services to modify and progress therapeutic interventions, address functional mobility deficits, address ROM deficits, address strength deficits, analyze and address soft tissue restrictions and analyze and cue movement patterns to attain remaining goals. [x]  See Plan of Care  []  See progress note/recertification  []  See Discharge Summary         Progress towards goals / Updated goals:  Short Term Goals: To be accomplished in 3 weeks:  1. Patient will subjectively report full compliance with prescribed HEP. Eval: HEP provided  Current: Progressing: Pt reports performing HEP 5x a week. 5/13/19  2. Patient will demonstrate left shoulder flexion and abduction PROM >/= 160 degrees to improve ease with dressing. Eval: Left Shoulder Flexion PROM 120 deg, Left Shoulder Abduction PROM 90 deg  Current: Progressing: left shoulder flexion PROM 150 deg, left shoulder ABD PROM 160 deg. 5/20/19  3.  Patient will demonstrate left shoulder flexion and abduction AROM >/= 150 degrees to improve ease with overhead reaching. Eval: Left Shoulder Flexion AROM = 67 deg, Left Shoulder Abduction AROM = 30 deg     Long Term Goals: To be accomplished in 6 weeks:  1. Patient will demonstrate a significant functional improvement as demonstrated by a score of >/= 69 on FOTO. Eval: FOTO = 41  Current FOTO 65 (5/31/19)  2. Patient will demonstrate left shoulder flexion and abduction MMT >/= 4+/5 to improve ease with work-related ADLs. Eval: Left Shoulder Flexion MMT = 2+/5, Left Shoulder Abduction MMT = 2_/5  3. Patient will subjectively report >/= 70% improvement in symptoms to improve overall quality of life.   Eval: 0%           PLAN  []  Upgrade activities as tolerated     [x]  Continue plan of care  []  Update interventions per flow sheet       []  Discharge due to:_  []  Other:_      Ravin Brito PTA 5/31/2019  11:26 AM    Future Appointments   Date Time Provider Maggi Meza   6/4/2019  3:00 PM Josephine Mckenzie PTA [facility]   6/5/2019  4:00 PM Erica Garcia, PT [facility]   6/10/2019  4:00 PM Devante Belle PTA [facility]   6/12/2019  4:00 PM Erica Garcia, PT [facility]

## 2019-06-03 ENCOUNTER — APPOINTMENT (OUTPATIENT)
Dept: PHYSICAL THERAPY | Age: 34
End: 2019-06-03
Payer: COMMERCIAL

## 2019-06-04 ENCOUNTER — HOSPITAL ENCOUNTER (OUTPATIENT)
Dept: PHYSICAL THERAPY | Age: 34
Discharge: HOME | End: 2019-06-04
Payer: COMMERCIAL

## 2019-06-04 PROCEDURE — 97140 MANUAL THERAPY 1/> REGIONS: CPT | Performed by: PHYSICAL THERAPIST

## 2019-06-04 PROCEDURE — 97110 THERAPEUTIC EXERCISES: CPT | Performed by: PHYSICAL THERAPIST

## 2019-06-04 NOTE — PROGRESS NOTES
PT DAILY TREATMENT NOTE 10-18    Patient Name: Lynsey Yoon  Date:2019  : 1985  [x]  Patient  Verified  Payor: BLUE TARA / Plan: [id number] PPO / Product Type: PPO /    In time:3:00  Out time:3:29  Total Treatment Time (min): 29  Visit #: 7 of 12    Medicare/BCBS Only   Total Timed Codes (min):  29 1:1 Treatment Time:  29       Treatment Area: Left shoulder pain [M25.512]    SUBJECTIVE  Pain Level (0-10 scale): 0/10  Any medication changes, allergies to medications, adverse drug reactions, diagnosis change, or new procedure performed?: [x] No    [] Yes (see summary sheet for update)  Subjective functional status/changes:   [] No changes reported  Patient reports she is about 70% of her normal activity level. She has pain with reaching laterally and/or backwards. She can sleep on her left side and pain does not wake her at night. OBJECTIVE        20 min Therapeutic Exercise:  [] See flow sheet :   Rationale: increase ROM and increase strength to improve the patients ability to increase their functional activity level. 9 min Manual Therapy:  BLAYNE guevara, grade III, manual stretching to increase elevation and rotation. Rationale: increase ROM and increase tissue extensibility to increase ease of motion to improve function. With   [] TE   [] TA   [] neuro   [] other: Patient Education: [x] Review HEP    [] Progressed/Changed HEP based on:   [] positioning   [] body mechanics   [] transfers   [] heat/ice application    [] other:      Other Objective/Functional Measures: PROM left shoulder WFL. Pain Level (0-10 scale) post treatment: 0/10    ASSESSMENT/Changes in Function: Patient with subjective improvement in function with decreased pain.     Patient will continue to benefit from skilled PT services to modify and progress therapeutic interventions, address functional mobility deficits, address ROM deficits, address strength deficits, analyze and address soft tissue restrictions and analyze and cue movement patterns to attain remaining goals. [x]  See Plan of Care  []  See progress note/recertification  []  See Discharge Summary         Progress towards goals / Updated goals:  Short Term Goals: To be accomplished in 3 weeks:  1. Patient will subjectively report full compliance with prescribed HEP. Eval: HEP provided  Current: Progressing: Pt reports performing HEP 5x a week. 5/13/19  2. Patient will demonstrate left shoulder flexion and abduction PROM >/= 160 degrees to improve ease with dressing. Eval: Left Shoulder Flexion PROM 120 deg, Left Shoulder Abduction PROM 90 deg  Current: Progressing: left shoulder flexion PROM 150 deg, left shoulder ABD PROM 160 deg. 5/20/19  3. Patient will demonstrate left shoulder flexion and abduction AROM >/= 150 degrees to improve ease with overhead reaching. Eval: Left Shoulder Flexion AROM = 67 deg, Left Shoulder Abduction AROM = 30 deg     Long Term Goals: To be accomplished in 6 weeks:  1. Patient will demonstrate a significant functional improvement as demonstrated by a score of >/= 69 on FOTO. Eval: FOTO = 41  Current FOTO 65 (5/31/19)  2. Patient will demonstrate left shoulder flexion and abduction MMT >/= 4+/5 to improve ease with work-related ADLs. Eval: Left Shoulder Flexion MMT = 2+/5, Left Shoulder Abduction MMT = 2_/5  3. Patient will subjectively report >/= 70% improvement in symptoms to improve overall quality of life. Eval: 0%  Current:  70%. 6/4/19. Met.     PLAN  [x]  Upgrade activities as tolerated     [x]  Continue plan of care  []  Update interventions per flow sheet       []  Discharge due to:_  []  Other:_      Maximiliano Ly PT 6/4/2019  3:01 PM    Future Appointments   Date Time Provider Maggi Meza   6/5/2019  4:00 PM Candido Diaz PT [facility]   6/10/2019  4:00 PM Chad Gonzalez [facility]   6/12/2019  4:00 PM Candido Diaz PT [facility]

## 2019-06-10 ENCOUNTER — HOSPITAL ENCOUNTER (OUTPATIENT)
Dept: PHYSICAL THERAPY | Age: 34
End: 2019-06-10
Payer: COMMERCIAL

## 2019-06-12 ENCOUNTER — HOSPITAL ENCOUNTER (OUTPATIENT)
Dept: PHYSICAL THERAPY | Age: 34
Discharge: HOME | End: 2019-06-12
Payer: COMMERCIAL

## 2019-06-12 PROCEDURE — 97110 THERAPEUTIC EXERCISES: CPT

## 2019-06-12 PROCEDURE — 97112 NEUROMUSCULAR REEDUCATION: CPT

## 2019-06-12 NOTE — PROGRESS NOTES
PT DAILY TREATMENT NOTE 10-18    Patient Name: Terry Wu  Date:2019  : 1985  [x]  Patient  Verified  Payor: BLUE CROSS / Plan: [id number] PPO / Product Type: PPO /    In time:400  Out time:428  Total Treatment Time (min): 28  Visit #: 8 of 12    Medicare/BCBS Only   Total Timed Codes (min):  28 1:1 Treatment Time:  23       Treatment Area: Left shoulder pain [M25.512]    SUBJECTIVE  Pain Level (0-10 scale): 0  Any medication changes, allergies to medications, adverse drug reactions, diagnosis change, or new procedure performed?: [x] No    [] Yes (see summary sheet for update)  Subjective functional status/changes:   [] No changes reported  Patient reports agreement with discharge today. OBJECTIVE    10 min Therapeutic Exercise:  [x] See flow sheet : Emphasis placed on improving available shoulder AROM and UE strength   Rationale: increase ROM and increase strength to improve the patients ability to improve ease with overhead ADLs    18 min Neuromuscular Re-education:  [x]  See flow sheet : Emphasis placed on improving activation and recruitment of the scapulothoracic and glenohumeral musculature and improving scapulohumeral rhythm with UE elevation   Rationale: increase ROM, increase strength and increase proprioception  to improve the patients ability to improve ease with household ADLs    With   [] TE   [] TA   [] neuro   [] other: Patient Education: [x] Review HEP    [] Progressed/Changed HEP based on:   [] positioning   [] body mechanics   [] transfers   [] heat/ice application    [] other:      Other Objective/Functional Measures: See goals below     Pain Level (0-10 scale) post treatment: 0    ASSESSMENT/Changes in Function: At this time patient to be discharged with patient demonstrating left shoulder AROM WNL with the exception of slight limitation and pain reproduction with functional ER.  Significant improvement in pain severity and irritability with patient without adverse response with exercise completion. Patient educated regarding the importance of gentle strengthening and stretching to promote further independent symptom progression. Patient in agreement with [facility]. []  See Plan of Care  []  See progress note/recertification  [x]  See Discharge Summary         Progress towards goals / Updated goals:    Short Term Goals: To be accomplished in 3 weeks:  1. Patient will subjectively report full compliance with prescribed HEP. Eval: HEP provided  Current: Progressing: Pt reports performing HEP 5x a week. 5/13/19  2. Patient will demonstrate left shoulder flexion and abduction PROM >/= 160 degrees to improve ease with dressing. Eval: Left Shoulder Flexion PROM 120 deg, Left Shoulder Abduction PROM 90 deg  Current: Met, Left Shoulder Flexion PROM 160 deg, Left Shoulder Abduction PROM 160 deg, 6/12/2019  3. Patient will demonstrate left shoulder flexion and abduction AROM >/= 150 degrees to improve ease with overhead reaching. Eval: Left Shoulder Flexion AROM = 67 deg, Left Shoulder Abduction AROM = 30 deg  Current: Met, Left Shoulder Flexion AROM = 158 deg, Left Shoulder Abduction AROM = 132 deg, 6/12/2019     Long Term Goals: To be accomplished in 6 weeks:  1. Patient will demonstrate a significant functional improvement as demonstrated by a score of >/= 69 on FOTO. Eval: FOTO = 41  Current: Met, FOTO = 76, 6/12/2019  2. Patient will demonstrate left shoulder flexion and abduction MMT >/= 4+/5 to improve ease with work-related ADLs. Eval: Left Shoulder Flexion MMT = 2+/5, Left Shoulder Abduction MMT = 2+/5  Current: Met, Left Shoulder Flexion MMT = 5/5, Left Shoulder Abduction MMT = 4+/5, 6/12/2019  3. Patient will subjectively report >/= 70% improvement in symptoms to improve overall quality of life.   Eval: 0%  Current:  Met, 70% improvement since So[facility] reported,  6/4/19    PLAN  []  Upgrade activities as tolerated     []  Continue plan of care  []  Update interventions per flow sheet       [x]  Discharge due to:_  []  Other:_      Soraya Anthony PT 6/12/2019  12:26 PM    Future Appointments   Date Time Provider Maggi Meza   6/12/2019  4:00 PM Danyelle Andres PT [facility]

## 2019-06-12 NOTE — PROGRESS NOTES
[facility]              [address]        [place], [address]   [phone] fax    Discharge Summary  Patient name: Quincy Peck Start of Care: 2019   Referral source: Rivera Green MD : [id number]   Medical/Treatment Diagnosis: Left shoulder pain [M25.512]  Payor: [facility] / Plan: [facility] PPO / Product Type: PPO /  Onset Date:~3 months prior to IE     Prior Hospitalization: see medical history Provider#: [id number]   Medications: Verified on Patient Summary List    Comorbidities: Depression, Allergies, BMI>30, HTN   Prior Level of Function: RHD, (I) Functional ADLs, Does Not Work Out, Work Full Time, (I) Driving   Visits from [place] of Care: 8    Missed Visits: 4  Reporting Period : 2019 to 2019    Summary of Care:    Short Term Goals: To be accomplished in 3 weeks:  1. Patient will subjectively report full compliance with prescribed HEP. Eval: HEP provided  At DC: Progressing: Pt reports performing HEP 5x a week  2. Patient will demonstrate left shoulder flexion and abduction PROM >/= 160 degrees to improve ease with dressing. Eval: Left Shoulder Flexion PROM 120 deg, Left Shoulder Abduction PROM 90 deg  At DC: Met, Left Shoulder Flexion PROM 160 deg, Left Shoulder Abduction PROM 160 deg  3. Patient will demonstrate left shoulder flexion and abduction AROM >/= 150 degrees to improve ease with overhead reaching. Eval: Left Shoulder Flexion AROM = 67 deg, Left Shoulder Abduction AROM = 30 deg  At DC: Met, Left Shoulder Flexion AROM = 158 deg, Left Shoulder Abduction AROM = 132 deg     Long Term Goals: To be accomplished in 6 weeks:  1. Patient will demonstrate a significant functional improvement as demonstrated by a score of >/= 69 on FOTO. Eval: FOTO = 41  At DC: Met, FOTO = 76  2. Patient will demonstrate left shoulder flexion and abduction MMT >/= 4+/5 to improve ease with work-related ADLs.   Eval: Left Shoulder Flexion MMT = 2+/5, Left Shoulder Abduction MMT = 2+/5  At DC: Met, Left Shoulder Flexion MMT = 5/5, Left Shoulder Abduction MMT = 4+/5  3. Patient will subjectively report >/= 70% improvement in symptoms to improve overall quality of life. Eval: 0%  At DC:  Met, 70% improvement since SoC reported    ASSESSMENT/RECOMMENDATIONS:    At this time patient to be discharged with patient demonstrating left shoulder AROM WNL with the exception of slight limitation and pain reproduction with functional ER. Significant improvement in pain severity and irritability with patient without adverse response with exercise completion. Patient educated regarding the importance of gentle strengthening and stretching to promote further independent symptom progression. Patient in agreement with PoC.     [x]Discontinue therapy: [x]Patient has reached or is progressing toward set goals      []Patient is non-compliant or has abdicated      []Due to lack of appreciable progress towards set 55 Ambika Jay, PT 6/12/2019 12:26 PM